# Patient Record
Sex: MALE | Race: WHITE | Employment: OTHER | URBAN - METROPOLITAN AREA
[De-identification: names, ages, dates, MRNs, and addresses within clinical notes are randomized per-mention and may not be internally consistent; named-entity substitution may affect disease eponyms.]

---

## 2017-04-25 ENCOUNTER — HOSPITAL ENCOUNTER (OUTPATIENT)
Dept: LAB | Age: 82
Discharge: HOME | End: 2017-04-25
Payer: MEDICARE

## 2017-04-25 PROCEDURE — 87186 SC STD MICRODIL/AGAR DIL: CPT | Performed by: INTERNAL MEDICINE

## 2017-04-25 PROCEDURE — 87077 CULTURE AEROBIC IDENTIFY: CPT | Performed by: INTERNAL MEDICINE

## 2017-04-25 PROCEDURE — 81001 URINALYSIS AUTO W/SCOPE: CPT | Performed by: INTERNAL MEDICINE

## 2017-04-25 PROCEDURE — 87086 URINE CULTURE/COLONY COUNT: CPT | Performed by: INTERNAL MEDICINE

## 2017-04-26 LAB
APPEARANCE UR: ABNORMAL
BACTERIA URNS QL MICRO: ABNORMAL /HPF
BILIRUB UR QL: NEGATIVE
CAOX CRY URNS QL MICRO: ABNORMAL
COLOR UR: YELLOW
EPITH CASTS URNS QL MICRO: ABNORMAL /LPF (ref 0–5)
GLUCOSE UR STRIP.AUTO-MCNC: NEGATIVE MG/DL
HGB UR QL STRIP: NEGATIVE
KETONES UR QL STRIP.AUTO: ABNORMAL MG/DL
LEUKOCYTE ESTERASE UR QL STRIP.AUTO: ABNORMAL
NITRITE UR QL STRIP.AUTO: NEGATIVE
PH UR STRIP: 5 [PH] (ref 5–8)
PROT UR STRIP-MCNC: NEGATIVE MG/DL
RBC #/AREA URNS HPF: NEGATIVE /HPF (ref 0–5)
SP GR UR REFRACTOMETRY: 1.03 (ref 1–1.03)
UROBILINOGEN UR QL STRIP.AUTO: 1 EU/DL (ref 0.2–1)
WBC URNS QL MICRO: ABNORMAL /HPF (ref 0–4)

## 2017-04-28 LAB
BACTERIA SPEC CULT: ABNORMAL
SERVICE CMNT-IMP: ABNORMAL

## 2017-06-07 ENCOUNTER — HOSPITAL ENCOUNTER (OUTPATIENT)
Dept: LAB | Age: 82
Discharge: HOME | End: 2017-06-07
Payer: MEDICARE

## 2017-06-07 PROCEDURE — 87086 URINE CULTURE/COLONY COUNT: CPT | Performed by: INTERNAL MEDICINE

## 2017-06-07 PROCEDURE — 81003 URINALYSIS AUTO W/O SCOPE: CPT | Performed by: INTERNAL MEDICINE

## 2017-06-08 LAB
APPEARANCE UR: NORMAL
BILIRUB UR QL: NEGATIVE
COLOR UR: YELLOW
GLUCOSE UR STRIP.AUTO-MCNC: NEGATIVE MG/DL
HGB UR QL STRIP: NEGATIVE
KETONES UR QL STRIP.AUTO: NEGATIVE MG/DL
LEUKOCYTE ESTERASE UR QL STRIP.AUTO: NEGATIVE
NITRITE UR QL STRIP.AUTO: NEGATIVE
PH UR STRIP: 5.5 [PH] (ref 5–8)
PROT UR STRIP-MCNC: NEGATIVE MG/DL
SP GR UR REFRACTOMETRY: 1.03 (ref 1–1.03)
UROBILINOGEN UR QL STRIP.AUTO: 0.2 EU/DL (ref 0.2–1)

## 2017-06-09 LAB
BACTERIA SPEC CULT: NORMAL
SERVICE CMNT-IMP: NORMAL

## 2017-06-15 ENCOUNTER — HOSPITAL ENCOUNTER (EMERGENCY)
Age: 82
Discharge: HOME | End: 2017-06-15
Attending: EMERGENCY MEDICINE | Admitting: EMERGENCY MEDICINE
Payer: MEDICARE

## 2017-06-15 VITALS
DIASTOLIC BLOOD PRESSURE: 54 MMHG | OXYGEN SATURATION: 92 % | TEMPERATURE: 98.7 F | BODY MASS INDEX: 25.07 KG/M2 | HEART RATE: 92 BPM | WEIGHT: 190 LBS | RESPIRATION RATE: 18 BRPM | SYSTOLIC BLOOD PRESSURE: 98 MMHG

## 2017-06-15 DIAGNOSIS — S61.219A LACERATION OF FINGER, INITIAL ENCOUNTER: Primary | ICD-10-CM

## 2017-06-15 DIAGNOSIS — S61.511A: ICD-10-CM

## 2017-06-15 PROCEDURE — 75810000293 HC SIMP/SUPERF WND  RPR

## 2017-06-15 PROCEDURE — 99283 EMERGENCY DEPT VISIT LOW MDM: CPT

## 2017-06-15 PROCEDURE — 77030018836 HC SOL IRR NACL ICUM -A

## 2017-06-15 PROCEDURE — 77030031132 HC SUT NYL COVD -A

## 2017-06-16 NOTE — ED TRIAGE NOTES
C/o laceration to right index finger and skin tear to right hand and wrist after falling. States he cut his hand on a chair. Denies hitting head. Denies LOC.

## 2017-06-16 NOTE — ED PROVIDER NOTES
Patient is a 80 y.o. male presenting with skin laceration. The history is provided by the patient. Rocky Andrea is a 80 y.o. male presents with c/o laceration to right index finger and skin tear to right wrist. Hit against a table this evening. Past Medical History:   Diagnosis Date    [facility]bestosis ([facility]     COPD (chronic obstructive pulmonary disease) ([facility]     Dementia     Depression     Hypertension     Hypothyroid     Lung nodule     Seizures (HCC)     Tracheobronchitis        History reviewed. No pertinent surgical history. History reviewed. No pertinent family history. Social History     Social History    Marital status:      Spouse name: N/A    Number of children: N/A    Years of education: N/A     Occupational History    Not on file. Social History Main Topics    Smoking status: Former Smoker     Packs/day: 1.00    Smokeless tobacco: Never Used    Alcohol use No    Drug use: No    Sexual activity: No     Other Topics Concern    Not on file     Social History Narrative         ALLERGIES: Review of patient's allergies indicates no known allergies. Review of Systems  Constitutional:  Denies malaise, fever, chills. Head:  Denies injury. Face:  Denies injury or pain. ENMT:  Denies sore throat. Neck:  Denies injury or pain. Chest:  Denies injury. Cardiac:  Denies chest pain or palpitations. Respiratory:  Denies cough, wheezing, difficulty breathing, shortness of breath. GI/ABD:  Denies injury, pain, distention, nausea, vomiting, diarrhea. :  Denies injury, pain, dysuria or urgency. Back:  Denies injury or pain. Pelvis:  Denies injury or pain. Extremity/MS:  Denies injury or pain. Neuro:  Denies headache, LOC, dizziness, neurologic symptoms/deficits/paresthesias.    Skin: Laceration skin tear    Vitals:    06/15/17 2048   BP: 98/54   Pulse: 92   Resp: 18   Temp: 98.7 °F (37.1 °C)   SpO2: 92%   Weight: 86.2 kg (190 lb) Physical Exam   Nursing note and vitals reviewed. CONSTITUTIONAL: Alert, in no apparent distress; well-developed; well-nourished. RESP: Chest clear, equal breath sounds. CV: S1 and S2 WNL; No murmurs, gallops or rubs. UPPER EXT:  Right index finger FROM, 2.5 cm superficial laceration over distal phalanx. Good cap refill, n/v intact Right wrist with FROM, large 4 cm superficial skin tear, bleeding controled and closed with steri strips. .  LOWER EXT: Normal inspection. NEURO: strength 5/5 and sym, sensation intact. SKIN: No rashes; Normal for age and stage. PSYCH:  Alert and oriented, normal affect. MDM  Number of Diagnoses or Management Options  Diagnosis management comments: IMPRESSION AND MEDICAL DECISION MAKING:  Based upon the patient's presentation with noted HPI and PE, along with the work up done in the emergency department, I believe that the patient has a superficial laceration to right index finger and skin tear to right wrist. Finger closed with 5 sutures and skin tear closed with steri strips. Will leave sutures in for 10 days. The patient will be discharged home. Warning signs of worsening condition were discussed and understood by the patient. Based on patient's age, coexisting illness, exam, and the results of this ED evaluation, the decision to treat as an outpatient was made. Based on the information available at time of discharge, acute pathology requiring immediate intervention was deemed relative unlikely. While it is impossible to completely exclude the possibility of underlying serious disease or worsening of condition, I feel the relative likelihood is extremely low. I discussed this uncertainty with the patient, who understood ED evaluation and treatment and felt comfortable with the outpatient treatment plan. All questions regarding care, test results, and follow up were answered. The patient is stable and appropriate to discharge.  They understand that they should return to the emergency department for any new or worsening symptoms. I stressed the importance of follow up for repeat assessment and possibly further evaluation/treatment. ED Course       Wound Repair  Date/Time: 6/15/2017 9:34 PM  Performed by: PAPreparation: skin prepped with Betadine  Pre-procedure re-eval: Immediately prior to the procedure, the patient was reevaluated and found suitable for the planned procedure and any planned medications. Time out: Immediately prior to the procedure a time out was called to verify the correct patient, procedure, equipment, staff and marking as appropriate. .  Location details: right index finger  Wound length:2.5 cm or less  Anesthesia: digital block    Anesthesia:  Anesthesia: digital block  Local Anesthetic: lidocaine 1% without epinephrine   Foreign bodies: no foreign bodies  Irrigation solution: saline  Irrigation method: syringe  Debridement: none  Skin closure: 5-0 nylon  Number of sutures: 5  Technique: simple and interrupted  Approximation: close  Dressing: tube gauze  Patient tolerance: Patient tolerated the procedure well with no immediate complications  My total time at bedside, performing this procedure was 16-30 minutes. Vitals:  Patient Vitals for the past 12 hrs:   Temp Pulse Resp BP SpO2   06/15/17 2048 98.7 °F (37.1 °C) 92 18 98/54 92 %         Medications ordered:   Medications - No data to display      Lab findings:  No results found for this or any previous visit (from the past 12 hour(s)). EKG interpretation by ED Physician:      X-Ray, CT or other radiology findings or impressions:  No orders to display       Progress notes, Consult notes or additional Procedure notes:       Disposition:  Diagnosis:   1. Laceration of finger, initial encounter    2. Tear of skin of wrist, right, initial encounter        Disposition:   9:38 PM  Pt reevaluated at this time and is resting comfortably in NAD.  Discussed results and findings, as well as, diagnosis and plan for discharge. Pt verbalizes understanding and agreement with plan. All questions addressed at this time. Follow-up Information     Follow up With Details Comments Contact Info    Latha León MD Schedule an appointment as soon as possible for a visit in 10 days For suture removal or return to ED for suture removal [address] [facility] [address] EMERGENCY DEPT  If symptoms worsen [address]  [phone]           Patient's Medications   Start Taking    No medications on file   Continue Taking    ALBUTEROL (ACCUNEB) 0.63 MG/3 ML NEBULIZER SOLUTION    0.63 mg by Nebulization route every six (6) hours as needed for Wheezing. AMLODIPINE (NORVASC) 5 MG TABLET    Take 5 mg by mouth daily. ASPIRIN 81 MG TABLET    Take 81 mg by mouth daily. ATORVASTATIN (LIPITOR) 20 MG TABLET    Take 20 mg by mouth daily. CHOLECALCIFEROL, VITAMIN D3, (VITAMIN D3) 2,000 UNIT TAB    Take 2,000 Units by mouth daily. CITALOPRAM (CELEXA) 20 MG TABLET    Take 20 mg by mouth daily. FLUTICASONE-SALMETEROL (ADVAIR DISKUS) 500-50 MCG/DOSE DISKUS INHALER    Take 1 Puff by inhalation two (2) times a day. HYDROCODONE-ACETAMINOPHEN (NORCO) 5-325 MG PER TABLET    Take 1 Tab by mouth every six (6) hours as needed. Max Daily Amount: 4 Tabs. LEVETIRACETAM (KEPPRA) 500 MG TABLET    Take 1,000 mg by mouth two (2) times a day. LEVOTHYROXINE (SYNTHROID) 75 MCG TABLET    Take 75 mcg by mouth Daily (before breakfast). LIDOCAINE (LIDODERM) 5 %    Apply patch to lateral right knee every morning prn and remove at night    LOSARTAN (COZAAR) 50 MG TABLET    Take 50 mg by mouth daily. MEMANTINE (NAMENDA) 10 MG TABLET    Take 10 mg by mouth two (2) times a day. OXYGEN-AIR DELIVERY SYSTEMS    3 L by Nasal route continuous. POLYETHYLENE GLYCOL (MIRALAX) 17 GRAM PACKET    Take 1 Packet by mouth daily.     TAMSULOSIN (FLOMAX) 0.4 MG CAPSULE    Take 0.4 mg by mouth daily. TELMISARTAN (MICARDIS) 40 MG TABLET    Take 40 mg by mouth daily. TIOTROPIUM (SPIRIVA WITH HANDIHALER) 18 MCG INHALATION CAPSULE    Take 1 Cap by inhalation daily.      These Medications have changed    No medications on file   Stop Taking    No medications on file

## 2017-06-16 NOTE — DISCHARGE INSTRUCTIONS
Cuts Closed With Adhesives: Care Instructions  Your Care Instructions  A cut can happen anywhere on your body. The doctor used an adhesive to close the cut. When the adhesive dries, it forms a film that holds the edges of the cut together. Skin adhesives are sometimes called liquid stitches. If the cut went deep and through the skin, the doctor may have put in a layer of stitches below the adhesive. The deeper layer of stitches brings the deep part of the cut together. These stitches will dissolve and don't need to be removed. You don't see the stitches, only the adhesive. You may have a bandage. The doctor has checked you carefully, but problems can develop later. If you notice any problems or new symptoms, get medical treatment right away. Follow-up care is a key part of your treatment and safety. Be sure to make and go to all appointments, and call your doctor if you are having problems. It's also a good idea to know your test results and keep a list of the medicines you take. How can you care for yourself at home? · Keep the cut dry for the first 24 to 48 hours. After this, you can shower if your doctor okays it. Pat the cut dry. · Don't soak the cut, such as in a bathtub. Your doctor will tell you when it's safe to get the cut wet. · If your doctor told you how to care for your cut, follow your doctor's instructions. If you did not get instructions, follow this general advice:  ¨ Do not put any kind of ointment, cream, or lotion over the area. This can make the adhesive fall off too soon. ¨ After the first 24 to 48 hours, wash around the cut with clean water 2 times a day. Do not use hydrogen peroxide or alcohol, which can slow healing. ¨ If the doctor told you to use a bandage, put on a new bandage after cleaning the cut or if the bandage gets wet or dirty. · Prop up the sore area on a pillow anytime you sit or lie down during the next 3 days. Try to keep it above the level of your heart. This will help reduce swelling. · Leave the skin adhesive on your skin until it falls off on its own. This may take 5 to 10 days. · Do not scratch, rub, or pick at the adhesive. · Do not put the sticky part of a bandage directly on the adhesive. · Avoid any activity that could cause your cut to reopen. · Be safe with medicines. Read and follow all instructions on the label. ¨ If the doctor gave you a prescription medicine for pain, take it as prescribed. ¨ If you are not taking a prescription pain medicine, ask your doctor if you can take an over-the-counter medicine. When should you call for help? Call your doctor now or seek immediate medical care if:  · You have new pain, or your pain gets worse. · The skin near the cut is cold or pale or changes color. · You have tingling, weakness, or numbness near the cut. · The cut starts to bleed. · You have trouble moving the area near the cut. · You have symptoms of infection, such as:  ¨ Increased pain, swelling, warmth, or redness around the cut. ¨ Red streaks leading from the cut. ¨ Pus draining from the cut. ¨ A fever. Watch closely for changes in your health, and be sure to contact your doctor if:  · The cut reopens. · You do not get better as expected. Where can you learn more? Go to http://vinicio-tani.info/. Enter P174 in the search box to learn more about \"Cuts Closed With Adhesives: Care Instructions. \"  Current as of: May 27, 2016  Content Version: 11.2  © 6289-1100 [employer]. Care instructions adapted under license by [employer] (which disclaims liability or warranty for this information). If you have questions about a medical condition or this instruction, always ask your healthcare professional. [address] any warranty or liability for your use of this information.

## 2017-06-16 NOTE — ED NOTES
Patients skin tear dressed with nonstick dressing and gauze wrap. Patients finger laceration dressed with tube dressing. Both clean, dry, intact.

## 2017-06-17 ENCOUNTER — HOME HEALTH ADMISSION (OUTPATIENT)
Dept: HOME HEALTH SERVICES | Facility: HOME HEALTH | Age: 82
End: 2017-06-17
Payer: MEDICARE

## 2017-07-10 ENCOUNTER — HOME CARE VISIT (OUTPATIENT)
Dept: HOME HEALTH SERVICES | Facility: HOME HEALTH | Age: 82
End: 2017-07-10
Payer: MEDICARE

## 2017-07-10 ENCOUNTER — HOME CARE VISIT (OUTPATIENT)
Dept: SCHEDULING | Facility: HOME HEALTH | Age: 82
End: 2017-07-10
Payer: MEDICARE

## 2017-07-10 PROCEDURE — 3331090003 HH PPS REVENUE ADJ

## 2017-07-10 PROCEDURE — 400013 HH SOC

## 2017-07-10 PROCEDURE — 3331090002 HH PPS REVENUE DEBIT

## 2017-07-10 PROCEDURE — G0151 HHCP-SERV OF PT,EA 15 MIN: HCPCS

## 2017-07-10 PROCEDURE — 3331090001 HH PPS REVENUE CREDIT

## 2017-07-11 ENCOUNTER — HOME CARE VISIT (OUTPATIENT)
Dept: HOME HEALTH SERVICES | Facility: HOME HEALTH | Age: 82
End: 2017-07-11
Payer: MEDICARE

## 2017-07-11 VITALS — SYSTOLIC BLOOD PRESSURE: 142 MMHG | HEART RATE: 84 BPM | OXYGEN SATURATION: 88 % | DIASTOLIC BLOOD PRESSURE: 54 MMHG

## 2017-07-13 ENCOUNTER — HOME CARE VISIT (OUTPATIENT)
Dept: HOME HEALTH SERVICES | Facility: HOME HEALTH | Age: 82
End: 2017-07-13
Payer: MEDICARE

## 2017-08-22 ENCOUNTER — HOSPITAL ENCOUNTER (OUTPATIENT)
Dept: GENERAL RADIOLOGY | Age: 82
Discharge: HOME | End: 2017-08-22
Attending: INTERNAL MEDICINE
Payer: MEDICARE

## 2017-08-22 DIAGNOSIS — Z77.090 CONTACT WITH AND (SUSPECTED) EXPOSURE TO ASBESTOS: ICD-10-CM

## 2017-08-22 PROCEDURE — 71020 XR CHEST PA LAT: CPT

## 2018-07-11 ENCOUNTER — APPOINTMENT (OUTPATIENT)
Dept: GENERAL RADIOLOGY | Age: 83
End: 2018-07-11
Attending: NURSE PRACTITIONER
Payer: MEDICARE

## 2018-07-11 ENCOUNTER — APPOINTMENT (OUTPATIENT)
Dept: VASCULAR SURGERY | Age: 83
End: 2018-07-11
Attending: EMERGENCY MEDICINE
Payer: MEDICARE

## 2018-07-11 ENCOUNTER — HOSPITAL ENCOUNTER (EMERGENCY)
Age: 83
Discharge: HOME | End: 2018-07-11
Attending: EMERGENCY MEDICINE
Payer: MEDICARE

## 2018-07-11 VITALS
OXYGEN SATURATION: 95 % | HEIGHT: 73 IN | BODY MASS INDEX: 26.37 KG/M2 | SYSTOLIC BLOOD PRESSURE: 166 MMHG | HEART RATE: 99 BPM | WEIGHT: 199 LBS | RESPIRATION RATE: 22 BRPM | DIASTOLIC BLOOD PRESSURE: 78 MMHG | TEMPERATURE: 98.2 F

## 2018-07-11 DIAGNOSIS — L03.116 CELLULITIS OF LEFT LOWER EXTREMITY: Primary | ICD-10-CM

## 2018-07-11 DIAGNOSIS — S90.32XA CONTUSION OF LEFT FOOT, INITIAL ENCOUNTER: ICD-10-CM

## 2018-07-11 LAB
ANION GAP SERPL CALC-SCNC: 5 MMOL/L (ref 3–18)
BASOPHILS # BLD: 0 K/UL (ref 0–0.1)
BASOPHILS NFR BLD: 0 % (ref 0–2)
BUN SERPL-MCNC: 28 MG/DL (ref 7–18)
BUN/CREAT SERPL: 23 (ref 12–20)
CALCIUM SERPL-MCNC: 8.8 MG/DL (ref 8.5–10.1)
CHLORIDE SERPL-SCNC: 105 MMOL/L (ref 100–108)
CO2 SERPL-SCNC: 31 MMOL/L (ref 21–32)
CREAT SERPL-MCNC: 1.21 MG/DL (ref 0.6–1.3)
DIFFERENTIAL METHOD BLD: ABNORMAL
EOSINOPHIL # BLD: 0.4 K/UL (ref 0–0.4)
EOSINOPHIL NFR BLD: 4 % (ref 0–5)
ERYTHROCYTE [DISTWIDTH] IN BLOOD BY AUTOMATED COUNT: 13.5 % (ref 11.6–14.5)
ERYTHROCYTE [SEDIMENTATION RATE] IN BLOOD: 33 MM/HR (ref 0–20)
GLUCOSE SERPL-MCNC: 102 MG/DL (ref 74–99)
HCT VFR BLD AUTO: 39.6 % (ref 36–48)
HGB BLD-MCNC: 12.9 G/DL (ref 13–16)
LYMPHOCYTES # BLD: 1.5 K/UL (ref 0.9–3.6)
LYMPHOCYTES NFR BLD: 18 % (ref 21–52)
MCH RBC QN AUTO: 31.7 PG (ref 24–34)
MCHC RBC AUTO-ENTMCNC: 32.6 G/DL (ref 31–37)
MCV RBC AUTO: 97.3 FL (ref 74–97)
MONOCYTES # BLD: 0.7 K/UL (ref 0.05–1.2)
MONOCYTES NFR BLD: 8 % (ref 3–10)
NEUTS SEG # BLD: 6 K/UL (ref 1.8–8)
NEUTS SEG NFR BLD: 70 % (ref 40–73)
PLATELET # BLD AUTO: 193 K/UL (ref 135–420)
PMV BLD AUTO: 9.3 FL (ref 9.2–11.8)
POTASSIUM SERPL-SCNC: 4.8 MMOL/L (ref 3.5–5.5)
RBC # BLD AUTO: 4.07 M/UL (ref 4.7–5.5)
SODIUM SERPL-SCNC: 141 MMOL/L (ref 136–145)
WBC # BLD AUTO: 8.6 K/UL (ref 4.6–13.2)

## 2018-07-11 PROCEDURE — 85025 COMPLETE CBC W/AUTO DIFF WBC: CPT | Performed by: NURSE PRACTITIONER

## 2018-07-11 PROCEDURE — 74011000258 HC RX REV CODE- 258: Performed by: EMERGENCY MEDICINE

## 2018-07-11 PROCEDURE — 73630 X-RAY EXAM OF FOOT: CPT

## 2018-07-11 PROCEDURE — 99284 EMERGENCY DEPT VISIT MOD MDM: CPT

## 2018-07-11 PROCEDURE — 96365 THER/PROPH/DIAG IV INF INIT: CPT

## 2018-07-11 PROCEDURE — 74011250636 HC RX REV CODE- 250/636: Performed by: EMERGENCY MEDICINE

## 2018-07-11 PROCEDURE — 80048 BASIC METABOLIC PNL TOTAL CA: CPT | Performed by: NURSE PRACTITIONER

## 2018-07-11 PROCEDURE — 93971 EXTREMITY STUDY: CPT

## 2018-07-11 PROCEDURE — 85652 RBC SED RATE AUTOMATED: CPT | Performed by: EMERGENCY MEDICINE

## 2018-07-11 RX ORDER — CEPHALEXIN 500 MG/1
500 CAPSULE ORAL 4 TIMES DAILY
Qty: 28 CAP | Refills: 0 | Status: SHIPPED | OUTPATIENT
Start: 2018-07-11 | End: 2018-07-18

## 2018-07-11 RX ORDER — VANCOMYCIN 2 GRAM/500 ML IN 0.9 % SODIUM CHLORIDE INTRAVENOUS
2000 ONCE
Status: DISCONTINUED | OUTPATIENT
Start: 2018-07-11 | End: 2018-07-11

## 2018-07-11 RX ORDER — CLINDAMYCIN PHOSPHATE 600 MG/50ML
600 INJECTION INTRAVENOUS EVERY 8 HOURS
Status: DISCONTINUED | OUTPATIENT
Start: 2018-07-11 | End: 2018-07-11

## 2018-07-11 RX ADMIN — PIPERACILLIN SODIUM,TAZOBACTAM SODIUM 4.5 G: 4; .5 INJECTION, POWDER, FOR SOLUTION INTRAVENOUS at 17:54

## 2018-07-11 RX ADMIN — SODIUM CHLORIDE 1000 ML: 900 INJECTION, SOLUTION INTRAVENOUS at 17:53

## 2018-07-11 NOTE — DISCHARGE INSTRUCTIONS
Cellulitis: Care Instructions  Your Care Instructions    Cellulitis is a skin infection caused by bacteria, most often strep or staph. It often occurs after a break in the skin from a scrape, cut, bite, or puncture, or after a rash. Cellulitis may be treated without doing tests to find out what caused it. But your doctor may do tests, if needed, to look for a specific bacteria, like methicillin-resistant Staphylococcus aureus (MRSA). The doctor has checked you carefully, but problems can develop later. If you notice any problems or new symptoms, get medical treatment right away. Follow-up care is a key part of your treatment and safety. Be sure to make and go to all appointments, and call your doctor if you are having problems. It's also a good idea to know your test results and keep a list of the medicines you take. How can you care for yourself at home? · Take your antibiotics as directed. Do not stop taking them just because you feel better. You need to take the full course of antibiotics. · Prop up the infected area on pillows to reduce pain and swelling. Try to keep the area above the level of your heart as often as you can. · If your doctor told you how to care for your wound, follow your doctor's instructions. If you did not get instructions, follow this general advice:  ¨ Wash the wound with clean water 2 times a day. Don't use hydrogen peroxide or alcohol, which can slow healing. ¨ You may cover the wound with a thin layer of petroleum jelly, such as Vaseline, and a nonstick bandage. ¨ Apply more petroleum jelly and replace the bandage as needed. · Be safe with medicines. Take pain medicines exactly as directed. ¨ If the doctor gave you a prescription medicine for pain, take it as prescribed. ¨ If you are not taking a prescription pain medicine, ask your doctor if you can take an over-the-counter medicine.   To prevent cellulitis in the future  · Try to prevent cuts, scrapes, or other injuries to your skin. Cellulitis most often occurs where there is a break in the skin. · If you get a scrape, cut, mild burn, or bite, wash the wound with clean water as soon as you can to help avoid infection. Don't use hydrogen peroxide or alcohol, which can slow healing. · If you have swelling in your legs (edema), support stockings and good skin care may help prevent leg sores and cellulitis. · Take care of your feet, especially if you have diabetes or other conditions that increase the risk of infection. Wear shoes and socks. Do not go barefoot. If you have athlete's foot or other skin problems on your feet, talk to your doctor about how to treat them. When should you call for help? Call your doctor now or seek immediate medical care if:    · You have signs that your infection is getting worse, such as:  ¨ Increased pain, swelling, warmth, or redness. ¨ Red streaks leading from the area. ¨ Pus draining from the area. ¨ A fever.     · You get a rash.    Watch closely for changes in your health, and be sure to contact your doctor if:    · You do not get better as expected. Where can you learn more? Go to http://vinicio-tani.info/. Brad Tee in the search box to learn more about \"Cellulitis: Care Instructions. \"  Current as of: May 10, 2017  Content Version: 11.7  © 6246-5212 [employer]. Care instructions adapted under license by [employer] (which disclaims liability or warranty for this information). If you have questions about a medical condition or this instruction, always ask your healthcare professional. [place] [address] any warranty or liability for your use of this information.

## 2018-07-11 NOTE — ED NOTES
I have reviewed discharge instructions with the patient. The patient verbalized understanding. Patient awaiting transport.  calling transport.

## 2018-07-11 NOTE — ED PROVIDER NOTES
EMERGENCY DEPARTMENT HISTORY AND PHYSICAL EXAM    5:11 PM      Date: 7/11/2018  Patient Name: Valente Vasquez    History of Presenting Illness     Chief Complaint   Patient presents with    Other         History Provided By: Patient    Chief Complaint: L foot discoloration  Duration:  5 days  Timing:  Progressive  Location: L foot  Quality: Not described  Severity: N/A  Modifying Factors: No modifying or aggravating factors were reported. Associated Symptoms: denies any other associated signs or symptoms      Additional History (Context): Valente Vasquez is a 80 y.o. male with PMHx of dementia and HTN who presents to the ED with c/o progressive L foot discoloration for the past 5 days. Patient reports he uses his L foot to \"kick the sheets off of the bed. \" Denies pain. Resides in [facility] in [place] and visited the facility nurse who told him his L foot is edematous and advised him to visit the ED. Patient is unsure if he injured his L foot. Denies HA, neck or abdominal pain. No modifying or aggravating factors were reported. No other symptoms or concerns were expressed. PCP: Kathryn Vergara MD    Current Facility-Administered Medications   Medication Dose Route Frequency Provider Last Rate Last Dose    piperacillin-tazobactam (ZOSYN) 4.5 g in 0.9% sodium chloride (MBP/ADV) 100 mL MBP  4.5 g IntraVENous Q6H Carly Galvez MD   Stopped at 07/11/18 1900     Current Outpatient Prescriptions   Medication Sig Dispense Refill    cephALEXin (KEFLEX) 500 mg capsule Take 1 Cap by mouth four (4) times daily for 7 days. 28 Cap 0    tamsulosin (FLOMAX) 0.4 mg capsule Take 0.4 mg by mouth daily.  losartan (COZAAR) 50 mg tablet Take 50 mg by mouth daily.  fluticasone-salmeterol (ADVAIR DISKUS) 500-50 mcg/dose diskus inhaler Take 1 Puff by inhalation two (2) times a day.  OXYGEN-AIR DELIVERY SYSTEMS 3 L by Nasal route continuous.       HYDROcodone-acetaminophen (NORCO) 5-325 mg per tablet Take 1 Tab by mouth every six (6) hours as needed. Max Daily Amount: 4 Tabs. 15 Tab 0    lidocaine (LIDODERM) 5 % Apply patch to lateral right knee every morning prn and remove at night 30 Each 3    polyethylene glycol (MIRALAX) 17 gram packet Take 1 Packet by mouth daily. 30 Packet 3    albuterol (ACCUNEB) 0.63 mg/3 mL nebulizer solution 0.63 mg by Nebulization route every six (6) hours as needed for Wheezing.  citalopram (CELEXA) 20 mg tablet Take 20 mg by mouth daily.  telmisartan (MICARDIS) 40 mg tablet Take 40 mg by mouth daily.  atorvastatin (LIPITOR) 20 mg tablet Take 20 mg by mouth daily.  levothyroxine (SYNTHROID) 75 mcg tablet Take 75 mcg by mouth Daily (before breakfast).  cholecalciferol, vitamin D3, (VITAMIN D3) 2,000 unit Tab Take 2,000 Units by mouth daily.  amLODIPine (NORVASC) 5 mg tablet Take 5 mg by mouth daily.  levETIRAcetam (KEPPRA) 500 mg tablet Take 1,000 mg by mouth two (2) times a day.  tiotropium (SPIRIVA WITH HANDIHALER) 18 mcg inhalation capsule Take 1 Cap by inhalation daily.  aspirin 81 mg tablet Take 81 mg by mouth daily.  memantine (NAMENDA) 10 mg tablet Take 10 mg by mouth two (2) times a day. Past History     Past Medical History:  Past Medical History:   Diagnosis Date    [facility]best[facility] [address] [facility]     COPD (chronic obstructive pulmonary disease) ([facility] [address] [facility]     Dementia     Depression     Hypertension     Hypothyroid     Lung nodule     Seizures (HCC)     Tracheobronchitis        Past Surgical History:  No past surgical history on file. Family History:  No family history on file. Social History:  Social History   Substance Use Topics    Smoking status: Former Smoker     Packs/day: 1.00    Smokeless tobacco: Never Used    Alcohol use No       Allergies:  No Known Allergies      Review of Systems       Review of Systems   Constitutional: Negative for activity change, fatigue and fever.    HENT: Negative for congestion and rhinorrhea. Eyes: Negative for visual disturbance. Respiratory: Negative for shortness of breath. Cardiovascular: Negative for chest pain and palpitations. Gastrointestinal: Negative for abdominal pain, diarrhea, nausea and vomiting. Genitourinary: Negative for dysuria and hematuria. Musculoskeletal: Negative for back pain. Skin: Positive for color change (L foot). Negative for rash. Neurological: Negative for dizziness, weakness and light-headedness. Psychiatric/Behavioral: Negative for agitation. All other systems reviewed and are negative. Physical Exam     Visit Vitals    /78    Pulse 99    Temp 98.2 °F (36.8 °C)    Resp 22    Ht 6' 1\" (1.854 m)    Wt 90.3 kg (199 lb)    SpO2 95%    BMI 26.25 kg/m2         Physical Exam   Constitutional: He is oriented to person, place, and time. He appears well-developed and well-nourished. No distress. HENT:   Head: Normocephalic and atraumatic. Eyes: Conjunctivae and EOM are normal.   Neck: Normal range of motion. Neck supple. No JVD present. Cardiovascular: Normal rate, regular rhythm and normal heart sounds. No murmur heard. Pulmonary/Chest: Effort normal and breath sounds normal. No respiratory distress. Abdominal: Soft. Bowel sounds are normal. He exhibits no distension and no mass. There is no tenderness. There is no rebound and no guarding. Musculoskeletal: Normal range of motion. He exhibits edema. He exhibits no tenderness. R Foot:  DP pulse appreciated  Normal capillary refill  No sign of ischemia  No hemosiderin deposit  No open wounds  L Foot:  DP pulse appreciated  Capillary refill normal  Significant erythema and bruising in LLE  L leg edema appreciated up to mid-calf, erythema extended to mid-calf  Bruising and ecchymosis noted  No sign of infection between toes   Neurological: He is alert and oriented to person, place, and time. Skin: Skin is warm. He is not diaphoretic.  There is erythema. Diagnostic Study Results     Labs -  Recent Results (from the past 12 hour(s))   CBC WITH AUTOMATED DIFF    Collection Time: 07/11/18  5:30 PM   Result Value Ref Range    WBC 8.6 4.6 - 13.2 K/uL    RBC 4.07 (L) 4.70 - 5.50 M/uL    HGB 12.9 (L) 13.0 - 16.0 g/dL    HCT 39.6 36.0 - 48.0 %    MCV 97.3 (H) 74.0 - 97.0 FL    MCH 31.7 24.0 - 34.0 PG    MCHC 32.6 31.0 - 37.0 g/dL    RDW 13.5 11.6 - 14.5 %    PLATELET 914 496 - 885 K/uL    MPV 9.3 9.2 - 11.8 FL    NEUTROPHILS 70 40 - 73 %    LYMPHOCYTES 18 (L) 21 - 52 %    MONOCYTES 8 3 - 10 %    EOSINOPHILS 4 0 - 5 %    BASOPHILS 0 0 - 2 %    ABS. NEUTROPHILS 6.0 1.8 - 8.0 K/UL    ABS. LYMPHOCYTES 1.5 0.9 - 3.6 K/UL    ABS. MONOCYTES 0.7 0.05 - 1.2 K/UL    ABS. EOSINOPHILS 0.4 0.0 - 0.4 K/UL    ABS. BASOPHILS 0.0 0.0 - 0.1 K/UL    DF AUTOMATED     METABOLIC PANEL, BASIC    Collection Time: 07/11/18  5:30 PM   Result Value Ref Range    Sodium 141 136 - 145 mmol/L    Potassium 4.8 3.5 - 5.5 mmol/L    Chloride 105 100 - 108 mmol/L    CO2 31 21 - 32 mmol/L    Anion gap 5 3.0 - 18 mmol/L    Glucose 102 (H) 74 - 99 mg/dL    BUN 28 (H) 7.0 - 18 MG/DL    Creatinine 1.21 0.6 - 1.3 MG/DL    BUN/Creatinine ratio 23 (H) 12 - 20      GFR est AA >60 >60 ml/min/1.73m2    GFR est non-AA 56 (L) >60 ml/min/1.73m2    Calcium 8.8 8.5 - 10.1 MG/DL       Radiologic Studies -   XR FOOT LT MIN 3 V    (Results Pending)   XR Foot LT (Interpretation by ED Physician):  No sign of osteomyelitis. Medical Decision Making   I am the first provider for this patient. I reviewed the vital signs, available nursing notes, past medical history, past surgical history, family history and social history. Vital Signs-Reviewed the patient's vital signs. Pulse Oximetry Analysis -  94% on room air, stable.     Cardiac Monitor:  Rate: 106  Rhythm:  Sinus Tachycardia     Records Reviewed: Nursing Notes and Old Medical Records (Time of Review: 5:11 PM)    ED Course: Progress Notes, Reevaluation, and Consults:  6:22 PM: Reviewed patient imaging. No acute pathology visualized on XR. Will call hospitalist for admission. Consult:  Discussed care with Dr. Maximus Santiago, on-call PCP. Standard discussion; including history of patients chief complaint, available diagnostic results, and treatment course. Will speak to PCP for follow up. Patient to be on abx for 10 days. 6:41 PM, 7/11/2018   Patient in no distress prior to discharge. No complaints, no leg pain. Vascular duplex reviewed with the Tech and no acute DVT. Provider Notes (Medical Decision Making): 80 y.o. male presents with significant L foot swelling, erythema and edema concern for acute infection. No recent trauma, good circulation. Will order duplex of LLE to rule out DVT, XR to rule out osteomyelitis. Abx will be initiated. Will discuss with hospitalist for admission. Diagnosis     Clinical Impression:   1. Cellulitis of left lower extremity    2. Contusion of left foot, initial encounter        Disposition: Discharge. 6:40 PM I have assessed the patient who will be discharged in stable condition. Patient is to return to emergency department if any new or worsening condition. I have given the patient instructions for discharge and follow-up. Patient understands and verbalizes agreement with plan, diagnosis, discharge, and follow-up. Follow-up Information     Follow up With Details Comments Contact Info    Kathryn Vergara MD Call in 1 day Follow Up From Emergency Department [address] [facility] [place] [address] EMERGENCY DEPT  If symptoms worsen [address] [facility] [address].  [phone]           Discharge Medication List as of 7/11/2018  6:40 PM      START taking these medications    Details   cephALEXin (KEFLEX) 500 mg capsule Take 1 Cap by mouth four (4) times daily for 7 days. , Print, Disp-28 Cap, R-0         CONTINUE these medications which have NOT CHANGED    Details tamsulosin (FLOMAX) 0.4 mg capsule Take 0.4 mg by mouth daily. , Historical Med      losartan (COZAAR) 50 mg tablet Take 50 mg by mouth daily. , Historical Med      fluticasone-salmeterol (ADVAIR DISKUS) 500-50 mcg/dose diskus inhaler Take 1 Puff by inhalation two (2) times a day., Historical Med      OXYGEN-AIR DELIVERY SYSTEMS 3 L by Nasal route continuous. , Historical Med      HYDROcodone-acetaminophen (NORCO) 5-325 mg per tablet Take 1 Tab by mouth every six (6) hours as needed. Max Daily Amount: 4 Tabs., Print, Disp-15 Tab, R-0      lidocaine (LIDODERM) 5 % Apply patch to lateral right knee every morning prn and remove at night, Print, Disp-30 Each, R-3      polyethylene glycol (MIRALAX) 17 gram packet Take 1 Packet by mouth daily. , No Print, Disp-30 Packet, R-3      albuterol (ACCUNEB) 0.63 mg/3 mL nebulizer solution 0.63 mg by Nebulization route every six (6) hours as needed for Wheezing., Historical Med      citalopram (CELEXA) 20 mg tablet Take 20 mg by mouth daily. Historical Med, 20 mg      telmisartan (MICARDIS) 40 mg tablet Take 40 mg by mouth daily. Historical Med, 40 mg      atorvastatin (LIPITOR) 20 mg tablet Take 20 mg by mouth daily. Historical Med, 20 mg      levothyroxine (SYNTHROID) 75 mcg tablet Take 75 mcg by mouth Daily (before breakfast). Historical Med, 75 mcg      cholecalciferol, vitamin D3, (VITAMIN D3) 2,000 unit Tab Take 2,000 Units by mouth daily. Historical Med, 2,000 Units      amLODIPine (NORVASC) 5 mg tablet Take 5 mg by mouth daily. Historical Med, 5 mg      levETIRAcetam (KEPPRA) 500 mg tablet Take 1,000 mg by mouth two (2) times a day. Historical Med, 1,000 mg      tiotropium (SPIRIVA WITH HANDIHALER) 18 mcg inhalation capsule Not intended for PRN useTake 1 Cap by inhalation daily. Historical Med, 1 Cap      aspirin 81 mg tablet Take 81 mg by mouth daily. Historical Med, 81 mg      memantine (NAMENDA) 10 mg tablet Take 10 mg by mouth two (2) times a day.   Historical Med, 10 mg           _______________________________    Attestations:  Scribe Attestation     Donna acting as a scribe for and in the presence of Carly Galvez MD      July 11, 2018 at 5:11 PM       Provider Attestation:      I personally performed the services described in the documentation, reviewed the documentation, as recorded by the scribe in my presence, and it accurately and completely records my words and actions.  July 11, 2018 at 5:11 PM - Carly Galvez MD    _______________________________

## 2018-10-12 ENCOUNTER — APPOINTMENT (OUTPATIENT)
Dept: CT IMAGING | Age: 83
End: 2018-10-12
Attending: EMERGENCY MEDICINE
Payer: MEDICARE

## 2018-10-12 ENCOUNTER — HOSPITAL ENCOUNTER (EMERGENCY)
Age: 83
Discharge: HOME | End: 2018-10-12
Attending: EMERGENCY MEDICINE
Payer: MEDICARE

## 2018-10-12 VITALS
SYSTOLIC BLOOD PRESSURE: 142 MMHG | RESPIRATION RATE: 18 BRPM | HEIGHT: 73 IN | WEIGHT: 200 LBS | HEART RATE: 107 BPM | BODY MASS INDEX: 26.51 KG/M2 | DIASTOLIC BLOOD PRESSURE: 72 MMHG | OXYGEN SATURATION: 92 %

## 2018-10-12 DIAGNOSIS — S01.81XA FACIAL LACERATION, INITIAL ENCOUNTER: Primary | ICD-10-CM

## 2018-10-12 DIAGNOSIS — S09.90XA INJURY OF HEAD, INITIAL ENCOUNTER: ICD-10-CM

## 2018-10-12 PROCEDURE — 90471 IMMUNIZATION ADMIN: CPT

## 2018-10-12 PROCEDURE — 77030018836 HC SOL IRR NACL ICUM -A

## 2018-10-12 PROCEDURE — 90715 TDAP VACCINE 7 YRS/> IM: CPT | Performed by: EMERGENCY MEDICINE

## 2018-10-12 PROCEDURE — 74011250636 HC RX REV CODE- 250/636: Performed by: EMERGENCY MEDICINE

## 2018-10-12 PROCEDURE — 70450 CT HEAD/BRAIN W/O DYE: CPT

## 2018-10-12 PROCEDURE — 77030031132 HC SUT NYL COVD -A

## 2018-10-12 PROCEDURE — 75810000293 HC SIMP/SUPERF WND  RPR

## 2018-10-12 PROCEDURE — 74011000250 HC RX REV CODE- 250: Performed by: EMERGENCY MEDICINE

## 2018-10-12 PROCEDURE — 70486 CT MAXILLOFACIAL W/O DYE: CPT

## 2018-10-12 PROCEDURE — 99285 EMERGENCY DEPT VISIT HI MDM: CPT

## 2018-10-12 RX ORDER — GLUCOSAMINE SULFATE 1500 MG
1000 POWDER IN PACKET (EA) ORAL DAILY
COMMUNITY

## 2018-10-12 RX ORDER — LIDOCAINE HYDROCHLORIDE AND EPINEPHRINE 10; 10 MG/ML; UG/ML
1.5 INJECTION, SOLUTION INFILTRATION; PERINEURAL ONCE
Status: COMPLETED | OUTPATIENT
Start: 2018-10-12 | End: 2018-10-12

## 2018-10-12 RX ORDER — FINASTERIDE 5 MG/1
5 TABLET, FILM COATED ORAL DAILY
COMMUNITY

## 2018-10-12 RX ORDER — FLUTICASONE PROPIONATE AND SALMETEROL 500; 50 UG/1; UG/1
1 POWDER RESPIRATORY (INHALATION) EVERY 12 HOURS
COMMUNITY

## 2018-10-12 RX ADMIN — TETANUS TOXOID, REDUCED DIPHTHERIA TOXOID AND ACELLULAR PERTUSSIS VACCINE, ADSORBED 0.5 ML: 5; 2.5; 8; 8; 2.5 SUSPENSION INTRAMUSCULAR at 06:50

## 2018-10-12 RX ADMIN — LIDOCAINE HYDROCHLORIDE AND EPINEPHRINE 15 MG: 10; 10 INJECTION, SOLUTION INFILTRATION; PERINEURAL at 06:06

## 2018-10-12 NOTE — ED TRIAGE NOTES
Pt had mechanical fall at nursing home. Abrasion to right knee. Old abrasion to left knee. Laceration to forhead. Denies LOC.  A+O X4

## 2018-10-12 NOTE — ED NOTES
Patient cleaned up from blood on face and made comfortable. Patient updated that his ride is coming and he will be discharged.

## 2018-10-12 NOTE — ED NOTES
Verbal shift change report given to Dilma Duarte (oncoming nurse) by Rafael Putnam RN (offgoing nurse). Report included the following information SBAR.

## 2018-10-12 NOTE — DISCHARGE INSTRUCTIONS
SUTURES OUT IN 3-5 DAYS           Learning About a Closed Head Injury  What is a closed head injury? A closed head injury happens when your head gets hit hard. The strong force of the blow causes your brain to shake in your skull. This movement can cause the brain to bruise, swell, or tear. Sometimes nerves or blood vessels also get damaged. This can cause bleeding in or around the brain. A concussion is a type of closed head injury. What are the symptoms? If you have a mild concussion, you may have a mild headache or feel \"not quite right. \" These symptoms are common. They usually go away over a few days to 4 weeks. But sometimes after a concussion, you feel like you can't function as well as before the injury. And you have new symptoms. This is called postconcussive syndrome. You may:  · Find it harder to solve problems, think, concentrate, or remember. · Have headaches. · Have changes in your sleep patterns, such as not being able to sleep or sleeping all the time. · Have changes in your personality. · Not be interested in your usual activities. · Feel angry or anxious without a clear reason. · Lose your sense of taste or smell. · Be dizzy, lightheaded, or unsteady. It may be hard to stand or walk. How is a closed head injury treated? Any person who may have a concussion needs to see a doctor. Some people have to stay in the hospital to be watched. Others can go home safely. If you go home, follow your doctor's instructions. He or she will tell you if you need someone to watch you closely for the next 24 hours or longer. Rest is the best treatment. Get plenty of sleep at night. And try to rest during the day. · Avoid activities that are physically or mentally demanding. These include housework, exercise, and schoolwork. And don't play video games, send text messages, or use the computer. You may need to change your school or work schedule to be able to avoid these activities.   · Ask your doctor when it's okay to drive, ride a bike, or operate machinery. · Take an over-the-counter pain medicine, such as acetaminophen (Tylenol), ibuprofen (Advil, Motrin), or naproxen (Aleve). Be safe with medicines. Read and follow all instructions on the label. · Check with your doctor before you use any other medicines for pain. · Do not drink alcohol or use illegal drugs. They can slow recovery. They can also increase your risk of getting a second head injury. Follow-up care is a key part of your treatment and safety. Be sure to make and go to all appointments, and call your doctor if you are having problems. It's also a good idea to know your test results and keep a list of the medicines you take. Where can you learn more? Go to http://vinicio-tani.info/. Enter E235 in the search box to learn more about \"Learning About a Closed Head Injury. \"  Current as of: June 4, 2018  Content Version: 11.8  © 2305-1372 [employer]. Care instructions adapted under license by [employer] (which disclaims liability or warranty for this information). If you have questions about a medical condition or this instruction, always ask your healthcare professional. [address] any warranty or liability for your use of this information. Cuts: Care Instructions  Your Care Instructions  A cut can happen anywhere on your body. Stitches, staples, skin adhesives, or pieces of tape called Steri-Strips are sometimes used to keep the edges of a cut together and help it heal. Steri-Strips can be used by themselves or with stitches or staples. Sometimes cuts are left open. If the cut went deep and through the skin, the doctor may have closed the cut in two layers. A deeper layer of stitches brings the deep part of the cut together. These stitches will dissolve and don't need to be removed.  The upper layer closure, which could be stitches, staples, Steri-Strips, or adhesive, is what you see on the cut. A cut is often covered by a bandage. The doctor has checked you carefully, but problems can develop later. If you notice any problems or new symptoms, get medical treatment right away. Follow-up care is a key part of your treatment and safety. Be sure to make and go to all appointments, and call your doctor if you are having problems. It's also a good idea to know your test results and keep a list of the medicines you take. How can you care for yourself at home? If a cut is open or closed  · Prop up the sore area on a pillow anytime you sit or lie down during the next 3 days. Try to keep it above the level of your heart. This will help reduce swelling. · Keep the cut dry for the first 24 to 48 hours. After this, you can shower if your doctor okays it. Pat the cut dry. · Don't soak the cut, such as in a bathtub. Your doctor will tell you when it's safe to get the cut wet. · After the first 24 to 48 hours, clean the cut with soap and water 2 times a day unless your doctor gives you different instructions. ¨ Don't use hydrogen peroxide or alcohol, which can slow healing. ¨ You may cover the cut with a thin layer of petroleum jelly and a nonstick bandage. ¨ If the doctor put a bandage over the cut, put on a new bandage after cleaning the cut or if the bandage gets wet or dirty. · Avoid any activity that could cause your cut to reopen. · Be safe with medicines. Read and follow all instructions on the label. ¨ If the doctor gave you a prescription medicine for pain, take it as prescribed. ¨ If you are not taking a prescription pain medicine, ask your doctor if you can take an over-the-counter medicine. If the cut is closed with stitches, staples, or Steri-Strips  · Follow the above instructions for open or closed cuts. · Do not remove the stitches or staples on your own. Your doctor will tell you when to come back to have the stitches or staples removed.   · Leave Steri-Strips on until they fall off. If the cut is closed with a skin adhesive  · Follow the above instructions for open or closed cuts. · Leave the skin adhesive on your skin until it falls off on its own. This may take 5 to 10 days. · Do not scratch, rub, or pick at the adhesive. · Do not put the sticky part of a bandage directly on the adhesive. · Do not put any kind of ointment, cream, or lotion over the area. This can make the adhesive fall off too soon. Do not use hydrogen peroxide or alcohol, which can slow healing. When should you call for help? Call your doctor now or seek immediate medical care if:    · You have new pain, or your pain gets worse.     · The skin near the cut is cold or pale or changes color.     · You have tingling, weakness, or numbness near the cut.     · The cut starts to bleed, and blood soaks through the bandage. Oozing small amounts of blood is normal.     · You have trouble moving the area near the cut.     · You have symptoms of infection, such as:  ¨ Increased pain, swelling, warmth, or redness around the cut. ¨ Red streaks leading from the cut. ¨ Pus draining from the cut. ¨ A fever.    Watch closely for changes in your health, and be sure to contact your doctor if:    · The cut reopens.     · You do not get better as expected. Where can you learn more? Go to http://vinicio-tani.info/. Enter M735 in the search box to learn more about \"Cuts: Care Instructions. \"  Current as of: November 20, 2017  Content Version: 11.8  © 1392-0324 [employer]. Care instructions adapted under license by [employer] (which disclaims liability or warranty for this information). If you have questions about a medical condition or this instruction, always ask your healthcare professional. [address] any warranty or liability for your use of this information.

## 2018-10-12 NOTE — ED NOTES
Dr. Valentino Cochran notified that patients oxygen saturation is 88 percent on nasal cannula. Patient is not breathing through nose at this time. Increased oxygen. Per Dr. Alisa Jones will continue to monitor patient.

## 2018-10-12 NOTE — ED PROVIDER NOTES
EMERGENCY DEPARTMENT HISTORY AND PHYSICAL EXAM 
 
6:24 AM 
 
 
Date: 10/12/2018 Patient Name: Tristin Toribio 
 
History of Presenting Illness Chief Complaint Patient presents with  Fall  Laceration History Provided By: Patient Chief Complaint: Beecher Aase Duration: This morning Timing:  Acute Location: Onto the floor Quality: Mechanical 
Severity: Modarate Modifying Factors: No modifying factors Associated Symptoms: Abrasions to nose, forehead laceration Additional History (Context):6:26 AM Tristin Toribio is a 80 y.o. male with h/o COPD, Seizures, HTN, and Dementia who presents to ED complaining of acute moderate mechanical fall on to the floor associated with abrasions to the nose and a forehead laceration onset this morning. Patient reports that he was using the bathroom and on his way to the bed he reports missing the bed and slipped onto the floor. There was no LOC. No other concerns or symptoms at this time. PCP: Niles Oneal MD 
 
 
Current Outpatient Prescriptions Medication Sig Dispense Refill  fluticasone-salmeterol (ADVAIR DISKUS) 500-50 mcg/dose diskus inhaler Take 1 Puff by inhalation every twelve (12) hours.  finasteride (PROSCAR) 5 mg tablet Take 5 mg by mouth daily.  cholecalciferol (VITAMIN D3) 1,000 unit cap Take 1,000 Units by mouth daily.  tamsulosin (FLOMAX) 0.4 mg capsule Take 0.4 mg by mouth daily.  losartan (COZAAR) 50 mg tablet Take 50 mg by mouth daily.  OXYGEN-AIR DELIVERY SYSTEMS 3 L by Nasal route continuous.  lidocaine (LIDODERM) 5 % Apply patch to lateral right knee every morning prn and remove at night 30 Each 3  
 albuterol (ACCUNEB) 0.63 mg/3 mL nebulizer solution 0.63 mg by Nebulization route every six (6) hours as needed for Wheezing.  atorvastatin (LIPITOR) 20 mg tablet Take 20 mg by mouth daily.  levothyroxine (SYNTHROID) 75 mcg tablet Take 75 mcg by mouth Daily (before breakfast).  cholecalciferol, vitamin D3, (VITAMIN D3) 2,000 unit Tab Take 2,000 Units by mouth daily.  amLODIPine (NORVASC) 5 mg tablet Take 5 mg by mouth daily.  levETIRAcetam (KEPPRA) 500 mg tablet Take 1,000 mg by mouth two (2) times a day.  tiotropium (SPIRIVA WITH HANDIHALER) 18 mcg inhalation capsule Take 1 Cap by inhalation daily.  aspirin 81 mg tablet Take 81 mg by mouth daily.  memantine (NAMENDA) 10 mg tablet Take 10 mg by mouth two (2) times a day.  fluticasone-salmeterol (ADVAIR DISKUS) 500-50 mcg/dose diskus inhaler Take 1 Puff by inhalation two (2) times a day.  HYDROcodone-acetaminophen (NORCO) 5-325 mg per tablet Take 1 Tab by mouth every six (6) hours as needed. Max Daily Amount: 4 Tabs. 15 Tab 0  
 polyethylene glycol (MIRALAX) 17 gram packet Take 1 Packet by mouth daily. 30 Packet 3  
 citalopram (CELEXA) 20 mg tablet Take 20 mg by mouth daily.  telmisartan (MICARDIS) 40 mg tablet Take 40 mg by mouth daily. Past History Past Medical History: 
Past Medical History:  
Diagnosis Date  Asbestosis(501)  COPD (chronic obstructive pulmonary disease) (HCC)  Dementia  Depression  Hypertension  Hypothyroid  Lung nodule  Seizures ([address]  Tracheobronchitis Past Surgical History: 
History reviewed. No pertinent surgical history. Family History: 
History reviewed. No pertinent family history. Social History: 
Social History Substance Use Topics  Smoking status: Former Smoker Packs/day: 1.00  Smokeless tobacco: Never Used  Alcohol use No  
 
 
Allergies: 
No Known Allergies Review of Systems Review of Systems Constitutional: Negative for diaphoresis and fever. HENT: Negative for congestion and sore throat. Eyes: Negative for pain and itching. Respiratory: Negative for cough and shortness of breath. Cardiovascular: Negative for chest pain and palpitations. Gastrointestinal: Negative for abdominal pain and diarrhea. Endocrine: Negative for polydipsia and polyuria. Genitourinary: Negative for dysuria and hematuria. Musculoskeletal: Negative for arthralgias and myalgias. Skin: Positive for wound (abrasions to the nose and forehead laceration). Negative for rash. Neurological: Negative for seizures and syncope. Hematological: Does not bruise/bleed easily. Psychiatric/Behavioral: Negative for agitation and hallucinations. Physical Exam  
 
Patient Vitals for the past 12 hrs: 
 Pulse Resp BP SpO2  
10/12/18 0730 - 18 142/72 92 % 10/12/18 0715 - 16 121/70 94 % 10/12/18 0700 - 15 124/64 94 % 10/12/18 0645 - 16 120/58 95 % 10/12/18 0630 - 17 134/66 95 % 10/12/18 0516 (!) 107 18 - 91 % Physical Exam  
Constitutional: He appears well-developed and well-nourished. HENT:  
Head: Normocephalic. Head is with laceration (3mm to the midline forehead). Nose: No nasal septal hematoma. 2 abrasions to the nose Eyes: Conjunctivae are normal. No scleral icterus. Neck: Normal range of motion. Neck supple. No JVD present. Cardiovascular: Normal rate, regular rhythm and normal heart sounds. 4 intact extremity pulses Pulmonary/Chest: Effort normal and breath sounds normal.  
Abdominal: Soft. He exhibits no mass. There is no tenderness. Musculoskeletal: Normal range of motion. Lymphadenopathy:  
  He has no cervical adenopathy. Neurological: He is alert. He has normal strength. No cranial nerve deficit or sensory deficit. Skin: Skin is warm and dry. Nursing note and vitals reviewed. Diagnostic Study Results Labs - No results found for this or any previous visit (from the past 12 hour(s)). Radiologic Studies -  
CT MAXILLOFACIAL WO CONT Final Result CT HEAD WO CONT Final Result Ct Head Wo Cont Result Date: 10/12/2018 EXAM: CT head HISTORY: -From Provider: Head trauma, closed, mild, GCS >= 13, no risk factors, neuro exam normal -Additional: None COMPARISON: 10/11/16 TECHNIQUE: Axial CT imaging of the head was performed without intravenous contrast. Sagittal and coronal reconstructions were created from the axial data. Motion degraded exam limiting sensitivity. One or more dose reduction techniques were used on this CT: automated exposure control, adjustment of the mAs and/or kVp according to patient size, and iterative reconstruction techniques. The specific techniques used on this CT exam have been documented in the patient's electronic medical record. _______________ FINDINGS: BRAIN, POSTERIOR FOSSA, EXTRA-AXIAL SPACES AND MENINGES:  The sulci, folia, ventricles and basal cisterns are   enlarged. Patchy hypodensity in the right temporal lobe, stable from prior. There is no intracranial hemorrhage, mass effect, or midline shift. There are no abnormal extra-axial fluid collections. CALVARIUM: Stable depression of the right temporal bone. SINUSES/MASTOIDS: Clear in visualized extent. OTHER: None. _______________ IMPRESSION: 1. No acute intracranial abnormalities are identified. 2. Stable depression of the right temporal bone with subjacent right temporal hypodensity/encephalomalacia. Please note that head CT may be negative in the acute setting and MRI could best further evaluate for acute/subacute ischemia/infarct in the high/persistent index of clinical suspicion. Preliminary report provided by the on-call resident at the time of the examination. No significant discrepancy. Ct Maxillofacial Wo Cont Result Date: 10/12/2018 EXAM:  CT Facial w/o Contrast INDICATION: Follow out of bed and struck face on bedside table. Laceration to nose and forehead. COMPARISON: None TECHNIQUE: Helical volumetric scanning was performed through the facial bones, orbits and mandible. Axial, coronal and sagittal reconstructions were necessary to optimally demonstrate the face.   One or more dose reduction techniques were used on this CT: automated exposure control, adjustment of the mAs and/or kVp according to patient size, and iterative reconstruction techniques. The specific techniques used on this CT exam have been documented in the patient's electronic medical record. _______________ FINDINGS: Slight undulations of the bilateral nasal bones are noted, slightly deviated to the patient's left, no displaced nasal fractures. The anterior maxillary spine and other facial bones are intact. No layering fluid in the paranasal sinuses. Mild mucosal thickening is present in bilateral ethmoid air cells. Bilateral aphakia is present. The orbital contents are otherwise normal. Smooth depression of the right temporal bone is present at the lateral aspect of the right middle cranial fossa with underlying encephalomalacia suggesting an old injury. Mild soft tissue swelling is present anterior to the maxillary spine with a small amount subcutaneous air into the left of midline, possibly due to the patient's laceration. Mild soft tissue swelling is present anterior to the frontal sinus on both sides of midline. The patient is nearly edentulous with a couple residual left mandibular incisors, and periapical lucency around one of the residual teeth suggesting odontogenic cyst or periapical abscess. _______________ IMPRESSION: 1. Slight undulation of both nasal bones without displacement, possibly nondisplaced acute fractures or old fractures. 2. Nothing else to suggest fracture. 3. Mild soft tissue swelling anterior to the lower maxilla and anterior to the frontal sinuses, possibly with a laceration anterior to the maxilla overlying subcutaneous air. A preliminary report was provided by the radiology resident physician on call at the time of the study. Medications ordered:  
Medications  
lidocaine-EPINEPHrine (XYLOCAINE) 1 %-1:100,000 injection 15 mg (15 mg IntraDERMal Given by Provider 10/12/18 0606) diph,Pertuss(AC),Tet Vac-PF (BOOSTRIX) suspension 0.5 mL (0.5 mL IntraMUSCular Given 10/12/18 0650) Preliminary Result IMPRESSION: CT HEAD No intracranial abnormality. Interpreted by Norberto Mott MD 6:34 AM 
 
 
Preliminary Result IMPRESSION: CT MAXILLOFACIAL Minimally displaced nasal fracture Interpreted by Norberto Mott MD 6:36 AM 
 
 
Medical Decision Making Initial Medical Decision Making and DDx: 
Not consistent wit intracranial hemorrhage but does have nasal bone fracture and a laceration. ED Course: Progress Notes, Reevaluation, and Consults: 
ED Course I am the first provider for this patient. I reviewed the vital signs, available nursing notes, past medical history, past surgical history, family history and social history. Vital Signs-Reviewed the patient's vital signs. Pulse Oximetry Analysis - 91% in room air, abnormal 
 
Cardiac Monitor: 
Rate/Rhythm:  107 bpm. Tachycardia Records Reviewed: Nursing Notes and Old Medical Records (Time of Review: 6:24 AM) Diagnosis Clinical Impression: 1. Facial laceration, initial encounter 2. Injury of head, initial encounter Disposition: DC Follow-up Information Follow up With Details Comments Contact Info Gayathri Dillon MD In 3 days  [address] [place] [phone] 
[phone] Discharge Medication List as of 10/12/2018  8:34 AM  
  
CONTINUE these medications which have NOT CHANGED Details  
!! fluticasone-salmeterol (ADVAIR DISKUS) 500-50 mcg/dose diskus inhaler Take 1 Puff by inhalation every twelve (12) hours. , Historical Med  
  
finasteride (PROSCAR) 5 mg tablet Take 5 mg by mouth daily. , Historical Med  
  
cholecalciferol (VITAMIN D3) 1,000 unit cap Take 1,000 Units by mouth daily. , Historical Med  
  
tamsulosin (FLOMAX) 0.4 mg capsule Take 0.4 mg by mouth daily. , Historical Med  
  
 losartan (COZAAR) 50 mg tablet Take 50 mg by mouth daily. , Historical Med OXYGEN-AIR DELIVERY SYSTEMS 3 L by Nasal route continuous. , Historical Med  
  
lidocaine (LIDODERM) 5 % Apply patch to lateral right knee every morning prn and remove at night, Print, Disp-30 Each, R-3  
  
albuterol (ACCUNEB) 0.63 mg/3 mL nebulizer solution 0.63 mg by Nebulization route every six (6) hours as needed for Wheezing., Historical Med  
  
atorvastatin (LIPITOR) 20 mg tablet Take 20 mg by mouth daily. Historical Med, 20 mg  
  
levothyroxine (SYNTHROID) 75 mcg tablet Take 75 mcg by mouth Daily (before breakfast). Historical Med, 75 mcg  
  
cholecalciferol, vitamin D3, (VITAMIN D3) 2,000 unit Tab Take 2,000 Units by mouth daily. Historical Med, 2,000 Units  
  
amLODIPine (NORVASC) 5 mg tablet Take 5 mg by mouth daily. Historical Med, 5 mg  
  
levETIRAcetam (KEPPRA) 500 mg tablet Take 1,000 mg by mouth two (2) times a day. Historical Med, 1,000 mg  
  
tiotropium (SPIRIVA WITH HANDIHALER) 18 mcg inhalation capsule Not intended for PRN useTake 1 Cap by inhalation daily. Historical Med, 1 Cap  
  
aspirin 81 mg tablet Take 81 mg by mouth daily. Historical Med, 81 mg  
  
memantine (NAMENDA) 10 mg tablet Take 10 mg by mouth two (2) times a day. Historical Med, 10 mg  
  
!! fluticasone-salmeterol (ADVAIR DISKUS) 500-50 mcg/dose diskus inhaler Take 1 Puff by inhalation two (2) times a day., Historical Med HYDROcodone-acetaminophen (NORCO) 5-325 mg per tablet Take 1 Tab by mouth every six (6) hours as needed. Max Daily Amount: 4 Tabs., Print, Disp-15 Tab, R-0  
  
polyethylene glycol (MIRALAX) 17 gram packet Take 1 Packet by mouth daily. , No Print, Disp-30 Packet, R-3  
  
citalopram (CELEXA) 20 mg tablet Take 20 mg by mouth daily. Historical Med, 20 mg  
  
telmisartan (MICARDIS) 40 mg tablet Take 40 mg by mouth daily.   Historical Med, 40 mg  
  
 !! - Potential duplicate medications found. Please discuss with provider.  
  
 
_______________________________ Attestations: 
Scribe Attestation Kathy Gardiner acting as a scribe for and in the presence of Elie William MD     
October 12, 2018 at 6:24 AM 
    
Provider Attestation:     
I personally performed the services described in the documentation, reviewed the documentation, as recorded by the scribe in my presence, and it accurately and completely records my words and actions. October 12, 2018 at 6:24 AM - Elie William MD   
_______________________________ Wound Repair 
Date/Time: 10/12/2018 6:39 AM 
Performed by: attendingPreparation: skin prepped with ChloraPrep Location details: face Wound length:2.6 - 7.5 cm Anesthesia: local infiltration Anesthesia: 
Local Anesthetic: lidocaine 1% with epinephrine Anesthetic total: 2 mL Foreign bodies: no foreign bodies Irrigation solution: saline Irrigation method: syringe Skin closure: 4-0 nylon Number of sutures: 4 Technique: interrupted Approximation: close Patient tolerance: Patient tolerated the procedure well with no immediate complications My total time at bedside, performing this procedure was 16-30 minutes.

## 2018-10-12 NOTE — ED NOTES
I have reviewed discharge instructions with patient and attempted to call report to the [facility]. Patient verbalized understanding and has no further questions at this time. Education taught and patient verbalized understanding of education. Teach back method used. Patients pain 0/10 at time of discharge. Belongings given to patient. Patient discharged with EMS transport to home.